# Patient Record
Sex: MALE | Race: WHITE | NOT HISPANIC OR LATINO | Employment: OTHER | URBAN - NONMETROPOLITAN AREA
[De-identification: names, ages, dates, MRNs, and addresses within clinical notes are randomized per-mention and may not be internally consistent; named-entity substitution may affect disease eponyms.]

---

## 2022-07-15 ENCOUNTER — OFFICE VISIT (OUTPATIENT)
Dept: URGENT CARE | Facility: CLINIC | Age: 52
End: 2022-07-15
Payer: COMMERCIAL

## 2022-07-15 VITALS
SYSTOLIC BLOOD PRESSURE: 130 MMHG | RESPIRATION RATE: 20 BRPM | DIASTOLIC BLOOD PRESSURE: 86 MMHG | HEART RATE: 81 BPM | OXYGEN SATURATION: 98 % | TEMPERATURE: 98.4 F

## 2022-07-15 DIAGNOSIS — L23.7 POISON SUMAC: Primary | ICD-10-CM

## 2022-07-15 PROCEDURE — 99203 OFFICE O/P NEW LOW 30 MIN: CPT

## 2022-07-15 PROCEDURE — S9088 SERVICES PROVIDED IN URGENT: HCPCS

## 2022-07-15 RX ORDER — CLOBETASOL PROPIONATE 0.5 MG/G
CREAM TOPICAL 2 TIMES DAILY
Qty: 60 G | Refills: 0 | Status: SHIPPED | OUTPATIENT
Start: 2022-07-15

## 2022-07-15 NOTE — PATIENT INSTRUCTIONS
Fels-naptha soap/ mild soap and luke warm water  Avoid pouring any alcohol or bleach on the wounds  Avoid scratching  Claritin or zyrtec daily

## 2022-07-15 NOTE — PROGRESS NOTES
[id number]        NAME: Serg Yanes is a 47 y o  male  : 1970    MRN: [id number]  DATE: July 15, 2022  TIME: 6:43 PM    Assessment and Plan   Poison sumac [L23 7]  1  Poison sumac  clobetasol (TEMOVATE) 0 05 % cream     Given no involvement of the face nor genitalia will treat with steroid  If condition worsens will give oral steroids  Patient Instructions   Fels-naptha soap/ mild soap and luke warm water  Avoid pouring any alcohol or bleach on the wounds  Avoid scratching  Claritin or zyrtec daily  Follow up with PCP in 3-5 days  Proceed to  ER if symptoms worsen  Chief Complaint     Chief Complaint   Patient presents with   [facility]     X 1 week on arms and legs  Using cortisone, aloe and calmine         History of Present Illness       Patient is 47year old male who presents to the office today for a rash on his arms, legs, abdomen, and side for about 1 week  Has been using cortisone, aloe, and calamine lotion with minimal relief  Did wash with hot water and alcohol  Denies any on face or genitalia  Review of Systems   Review of Systems   Constitutional: Negative for chills, fatigue and fever  Respiratory: Negative for cough, shortness of breath and wheezing  Cardiovascular: Negative for chest pain and palpitations  Gastrointestinal: Negative for diarrhea, nausea and vomiting  Skin: Positive for rash  Neurological: Negative for headaches  All other systems reviewed and are negative          Current Medications       Current Outpatient Medications:     clobetasol (TEMOVATE) 0 05 % cream, Apply topically 2 (two) times a day, Disp: 60 g, Rfl: 0    Current Allergies     Allergies as of 07/15/2022    (No Known Allergies)            The following portions of the patient's history were reviewed and updated as appropriate: allergies, current medications, past family history, past medical history, past social history, past surgical history and problem list  History reviewed  No pertinent past medical history  History reviewed  No pertinent surgical history  History reviewed  No pertinent family history  Medications have been verified  Objective   /86   Pulse 81   Temp 98 4 °F (36 9 °C)   Resp 20   SpO2 98%        Physical Exam     Physical Exam  Vitals and nursing note reviewed  Constitutional:       General: He is not in acute distress  Appearance: Normal appearance  He is normal weight  He is not ill-appearing or toxic-appearing  Cardiovascular:      Rate and Rhythm: Normal rate and regular rhythm  Pulses: Normal pulses  Pulmonary:      Effort: Pulmonary effort is normal       Breath sounds: Normal breath sounds  Lymphadenopathy:      Cervical: No cervical adenopathy  Skin:     Capillary Refill: Capillary refill takes less than 2 seconds  Findings: Rash present  Comments: Poison sumac on bilateral upper and lower and extremities, abdomen  Neurological:      General: No focal deficit present  Mental Status: He is alert

## 2022-07-18 DIAGNOSIS — L23.7 ALLERGIC DERMATITIS DUE TO POISON SUMAC: Primary | ICD-10-CM

## 2022-07-18 RX ORDER — PREDNISONE 20 MG/1
TABLET ORAL
Qty: 18 TABLET | Refills: 0 | Status: SHIPPED | OUTPATIENT
Start: 2022-07-18

## 2022-07-18 NOTE — PROGRESS NOTES
Patient called requesting oral steroids stating that the steroid cream prescribed on Friday is not helping for his poison sumac and the dermatitis is getting worse  Will send taper dose of prednisone to the pharmacy

## 2023-11-21 ENCOUNTER — DOCTOR'S OFFICE (OUTPATIENT)
Dept: URBAN - NONMETROPOLITAN AREA CLINIC 1 | Facility: CLINIC | Age: 53
Setting detail: OPHTHALMOLOGY
End: 2023-11-21
Payer: COMMERCIAL

## 2023-11-21 ENCOUNTER — OPTICAL OFFICE (OUTPATIENT)
Dept: URBAN - NONMETROPOLITAN AREA CLINIC 4 | Facility: CLINIC | Age: 53
Setting detail: OPHTHALMOLOGY
End: 2023-11-21
Payer: COMMERCIAL

## 2023-11-21 DIAGNOSIS — H52.4: ICD-10-CM

## 2023-11-21 DIAGNOSIS — H52.223: ICD-10-CM

## 2023-11-21 DIAGNOSIS — H52.03: ICD-10-CM

## 2023-11-21 PROBLEM — G51.0 BELLS PALSY: Status: ACTIVE | Noted: 2023-11-21

## 2023-11-21 PROBLEM — H25.13 CATARACT NUCLEAR SCLEROSIS; BOTH EYES: Status: ACTIVE | Noted: 2023-11-21

## 2023-11-21 PROBLEM — D31.32 BENIGN NEOPLASM OF LEFT CHOROID: Status: ACTIVE | Noted: 2023-11-21

## 2023-11-21 PROCEDURE — V2784 LENS POLYCARB OR EQUAL: HCPCS

## 2023-11-21 PROCEDURE — 92004 COMPRE OPH EXAM NEW PT 1/>: CPT

## 2023-11-21 PROCEDURE — 92015 DETERMINE REFRACTIVE STATE: CPT

## 2023-11-21 PROCEDURE — V2784 LENS POLYCARB OR EQUAL: HCPCS | Mod: LT

## 2023-11-21 PROCEDURE — V2020 VISION SVCS FRAMES PURCHASES: HCPCS

## 2023-11-21 PROCEDURE — V2103 SPHEROCYLINDR 4.00D/12-2.00D: HCPCS | Mod: LT

## 2023-11-21 PROCEDURE — V2103 SPHEROCYLINDR 4.00D/12-2.00D: HCPCS

## 2023-11-21 ASSESSMENT — TONOMETRY
OD_IOP_MMHG: 16
OS_IOP_MMHG: 16

## 2023-11-21 ASSESSMENT — SPHEQUIV_DERIVED
OD_SPHEQUIV: -0.125
OD_SPHEQUIV: 0.125
OS_SPHEQUIV: -0.125
OS_SPHEQUIV: 0.625

## 2023-11-21 ASSESSMENT — REFRACTION_AUTOREFRACTION
OS_SPHERE: +1.25
OD_CYLINDER: -0.75
OS_CYLINDER: -1.25
OS_AXIS: 105
OD_SPHERE: +0.50
OD_AXIS: 069

## 2023-11-21 ASSESSMENT — REFRACTION_MANIFEST
OD_SPHERE: +0.25
OD_VA2: 20/20
OS_VA1: 20/20-2
OS_VA2: 20/20
OD_VA1: 20/20
OS_ADD: +2.00
OS_CYLINDER: -0.75
OU_VA: 20/20
OD_ADD: +2.00
OD_AXIS: 065
OS_SPHERE: +0.25
OD_CYLINDER: -0.75
OS_AXIS: 105

## 2023-11-21 ASSESSMENT — CONFRONTATIONAL VISUAL FIELD TEST (CVF)
OS_FINDINGS: FULL
OD_FINDINGS: FULL

## 2023-11-21 ASSESSMENT — VISUAL ACUITY
OS_BCVA: 20/20-1
OD_BCVA: 20/20